# Patient Record
Sex: MALE | Race: WHITE | NOT HISPANIC OR LATINO | Employment: FULL TIME | URBAN - NONMETROPOLITAN AREA
[De-identification: names, ages, dates, MRNs, and addresses within clinical notes are randomized per-mention and may not be internally consistent; named-entity substitution may affect disease eponyms.]

---

## 2017-02-15 ENCOUNTER — OFFICE VISIT (OUTPATIENT)
Dept: RETAIL CLINIC | Facility: CLINIC | Age: 62
End: 2017-02-15

## 2017-02-15 VITALS
BODY MASS INDEX: 33.32 KG/M2 | RESPIRATION RATE: 18 BRPM | HEART RATE: 82 BPM | DIASTOLIC BLOOD PRESSURE: 84 MMHG | TEMPERATURE: 97.9 F | OXYGEN SATURATION: 98 % | WEIGHT: 251.4 LBS | SYSTOLIC BLOOD PRESSURE: 160 MMHG | HEIGHT: 73 IN

## 2017-02-15 DIAGNOSIS — L03.818 CELLULITIS OF OTHER SPECIFIED SITE: Primary | ICD-10-CM

## 2017-02-15 PROCEDURE — 99203 OFFICE O/P NEW LOW 30 MIN: CPT | Performed by: NURSE PRACTITIONER

## 2017-02-15 RX ORDER — MELATONIN 10 MG
CAPSULE ORAL
COMMUNITY
Start: 2012-11-08 | End: 2017-02-15 | Stop reason: ALTCHOICE

## 2017-02-15 RX ORDER — SIMVASTATIN 40 MG
TABLET ORAL
COMMUNITY
Start: 2012-11-08

## 2017-02-15 RX ORDER — AMLODIPINE BESYLATE 10 MG/1
TABLET ORAL
COMMUNITY
Start: 2012-11-08

## 2017-02-15 RX ORDER — CEPHALEXIN 500 MG/1
500 TABLET ORAL 3 TIMES DAILY
Qty: 30 TABLET | Refills: 0 | Status: SHIPPED | OUTPATIENT
Start: 2017-02-15 | End: 2017-02-25

## 2017-02-15 RX ORDER — PHENOL 1.4 %
20 AEROSOL, SPRAY (ML) MUCOUS MEMBRANE NIGHTLY PRN
COMMUNITY

## 2017-02-15 RX ORDER — ETODOLAC 400 MG/1
TABLET, FILM COATED ORAL
COMMUNITY
Start: 2012-11-08 | End: 2017-02-15 | Stop reason: ALTCHOICE

## 2017-02-15 NOTE — PROGRESS NOTES
"Subjective   Charles Gonzalez is a 62 y.o. male.     HPI Comments: Patient reports yesterday right lateral hand was punctured with a piece of copper wire used at his work. Wire is not terribly dirty but no clean. Was not concerned until the area became very red and swollen during the day at work today. Patient created a small opening in the swelling to release some clear fluid but is concerned with the rapid development of swelling and redness and concerned for infection. Also has a small similar spot on his right elbow that he does not remember injuring. No meds today for symptoms. Using antibiotic ointment only. Reports Tdap about 2 years ago with Dr. Alcaraz.     Hand Pain    Pertinent negatives include no numbness.        Allergies   Allergen Reactions   • No Known Drug Allergy          The following portions of the patient's history were reviewed and updated as appropriate: allergies, current medications, past family history, past medical history, past social history, past surgical history and problem list.    Review of Systems   Constitutional: Negative for chills and fever.   Skin: Positive for color change and wound.   Neurological: Negative for numbness and headaches.       Objective     Visit Vitals   • /84 (BP Location: Right arm)   • Pulse 82   • Temp 97.9 °F (36.6 °C) (Oral)   • Resp 18   • Ht 73\" (185.4 cm)   • Wt 251 lb 6.4 oz (114 kg)   • SpO2 98%   • BMI 33.17 kg/m2       Physical Exam   Pulmonary/Chest: Effort normal. No respiratory distress.   Skin: Skin is warm and dry.   Right lateral elbow with a 1cm round elevated, mildly erythematous nodule non-tender with a small amount of clear fluid from central pinpoint opening; right lateral hand with a 4cm x1cm quite erythematous, elevated, firm lesion, slightly warm and slightly tender with a central 1mm opening patient indicated he created today with a small amount of clear fluid draining     General Appearance:  Well-appearing, well-developed, well " hydrated, well nourished, no acute distress, alert and oriented, appears stated age, comfortable, pleasant, cooperative  Head/face:  Normocephalic, atraumatic  Eyes:  Pupils equal, sclera clear, EOMI  Neurologic:  Alert; no focal deficits; age appropriate behavior and speech      Assessment/Plan   Charles was seen today for hand pain.    Diagnoses and all orders for this visit:    Cellulitis of other specified site    Other orders  -     Cephalexin 500 MG tablet; Take 500 mg by mouth 3 (Three) Times a Day for 10 days.        You are diagnosed with cellulitis (a skin infection). You must keep the area clean and dry. Take antibiotics as prescribed, do not stop even if the infection seems to be gone. If you are not better within 72 hours or symptoms worsen such as development of fever, extension of redness, increased drainage, pain or other concerns, go to your primary care provider or the ER sooner. You verbalized understanding of these instructions and received a copy of this visit summary.         CHAVA Fritz

## 2017-02-15 NOTE — PATIENT INSTRUCTIONS
You are diagnosed with cellulitis (a skin infection). You must keep the area clean and dry. Take antibiotics as prescribed, do not stop even if the infection seems to be gone. If you are not better within 72 hours or symptoms worsen such as development of fever, extension of redness, increased drainage, pain or other concerns, go to your primary care provider or the ER sooner. You verbalized understanding of these instructions and received a copy of this visit summary.    CHAVA Fritz